# Patient Record
Sex: FEMALE | NOT HISPANIC OR LATINO | Employment: UNEMPLOYED | URBAN - METROPOLITAN AREA
[De-identification: names, ages, dates, MRNs, and addresses within clinical notes are randomized per-mention and may not be internally consistent; named-entity substitution may affect disease eponyms.]

---

## 2021-06-01 ENCOUNTER — OFFICE VISIT (OUTPATIENT)
Dept: DENTISTRY | Facility: CLINIC | Age: 41
End: 2021-06-01

## 2021-06-01 VITALS — DIASTOLIC BLOOD PRESSURE: 85 MMHG | TEMPERATURE: 98.1 F | SYSTOLIC BLOOD PRESSURE: 141 MMHG | HEART RATE: 79 BPM

## 2021-06-01 DIAGNOSIS — Z01.20 ENCOUNTER FOR DENTAL EXAMINATION: Primary | ICD-10-CM

## 2021-06-01 NOTE — PROGRESS NOTES
Removable    Pt presents for a denture remake visit because upper immediate delivered is so bad  and gave verbal consent for treatment:    Reviewed health history - Pt is ASA 1    Treatment consents signed: Yes    Perio: Non applicable    Pain Scale: 0    Caries Assessment: Non applicable     Radiographs: Films are current    Oral Hygiene instruction reviewed    Explained denture making process to patient, including the necessary number of visits and timeframe to make denture  Reviewed denture expectations, adjustment period, and hygiene  Upper alginate impressions made using metal tray and used old cast for lower arch   Apologized to the patient again for the bad denture she received on delivery day and I will ask the lab to expedite the process       Dr Shelley Turner / Dr Marga Bates were present   NV: Border molding and final impression

## 2021-06-21 ENCOUNTER — OFFICE VISIT (OUTPATIENT)
Dept: DENTISTRY | Facility: CLINIC | Age: 41
End: 2021-06-21

## 2021-06-21 VITALS — SYSTOLIC BLOOD PRESSURE: 139 MMHG | TEMPERATURE: 97.3 F | HEART RATE: 103 BPM | DIASTOLIC BLOOD PRESSURE: 80 MMHG

## 2021-06-21 DIAGNOSIS — K08.109 TEETH MISSING: Primary | ICD-10-CM

## 2021-06-21 PROCEDURE — WIS5001 FINAL IMPRESSIONS DENTURE: Performed by: DENTIST

## 2021-06-21 NOTE — PROGRESS NOTES
Pt presents for  final impression  PMH reviewed, no changes  Custom tray evaluated intraorally, flanges ~2-3mm short of vestibule  Border molding completed with medium body PVS and removed after 5 min  Confirmed impression captured vestibules and all tissues without voids or distortions  Pt left satisfied and ambulatory  Pt is interested in getting fixed denture on upper arch, pt was treatment planned and given a copy for costs (located in [employer]), pt was given a referral form for [facility] to call and schedule next month  This denture is a remake cause her immediate did not fit well at all  Pt is aware     Dr José Miguel Verma / Dr Alonso Young were present   NV: wax rims

## 2021-08-10 ENCOUNTER — OFFICE VISIT (OUTPATIENT)
Dept: DENTISTRY | Facility: CLINIC | Age: 41
End: 2021-08-10

## 2021-08-10 VITALS — SYSTOLIC BLOOD PRESSURE: 135 MMHG | DIASTOLIC BLOOD PRESSURE: 77 MMHG | HEART RATE: 94 BPM | TEMPERATURE: 97.5 F

## 2021-08-10 DIAGNOSIS — K08.109 MISSING TEETH, ACQUIRED: Primary | ICD-10-CM

## 2021-08-10 PROCEDURE — D0180 COMPREHENSIVE PERIODONTAL EVALUATION - NEW OR ESTABLISHED PATIENT: HCPCS | Performed by: DENTIST

## 2021-08-10 PROCEDURE — WIS5001 FINAL IMPRESSIONS DENTURE: Performed by: DENTIST

## 2021-08-10 NOTE — PROGRESS NOTES
Pt presents for final impressions for max complete denture  Medical history reviewed, no changes  Pt was scheduled for wax rim apt but we were unable to locate the case  We found upper silginate impression from previous appointment  Tx today:     Final maxillary impression made using light body PVS using upper silginate impression as a custom tray  Border molding movements performed  Opposing lower impression made using silginate to establish occlusal plane of upper denture    Perio eval performed  All probings 4mm or less except for the mesial of #29  Pt will need perio maintenance with localized scaling in that area  Mandibular anteriors (23-26) present with mobility  Some displaying class 2 mobility  Other findings: missing restoration #29 DO  Planned for DO composite  Pt is still interested in upper fixed prosthesis  Discussed cost of the tx with the pt  Pt understands  Dr Gi Le translated throughout the apt       Nv: MMR  Nvv: Perio maintenance  Nvvv: restorative     Ww: Dr Gi Le

## 2021-08-25 LAB — DENTAL LAB ACCEPTED: YES

## 2021-08-27 ENCOUNTER — OFFICE VISIT (OUTPATIENT)
Dept: DENTISTRY | Facility: CLINIC | Age: 41
End: 2021-08-27

## 2021-08-27 VITALS — TEMPERATURE: 97.5 F | HEART RATE: 89 BPM | SYSTOLIC BLOOD PRESSURE: 159 MMHG | DIASTOLIC BLOOD PRESSURE: 95 MMHG

## 2021-08-27 DIAGNOSIS — K08.109 EDENTULOUS: Primary | ICD-10-CM

## 2021-08-27 PROCEDURE — D6010 SURGICAL PLACEMENT OF IMPLANT BODY - ENDOSTEAL IMPLANT: HCPCS | Performed by: DENTIST

## 2021-08-27 RX ORDER — AMOXICILLIN 500 MG/1
500 CAPSULE ORAL EVERY 8 HOURS SCHEDULED
Qty: 21 CAPSULE | Refills: 0 | Status: SHIPPED | OUTPATIENT
Start: 2021-08-27 | End: 2021-09-03

## 2021-08-27 NOTE — PROGRESS NOTES
Implant Placement All-On-6    P: Implant placement #4-8-1-9-11-13  H: RMH - no changes    O: OH - good    T: Applied topical benzocaine, administered 4carps 2% lido 1:100k epi via infiltration and 2carps 4% articaine 1:100k epi via infiltration, and 2 carps 3% carbocaine plain  Made incision on palatal aspect of edentulous #3 to edentulous #14  Made full thickness flap on buccal of #edentulous #3 to edentulous #14  palated reflected a flap with releasing incision mesial to #6  Made initial purchase w/ surgical handpiece and elie bur  Irrigated w/ sterile water  #4: Sequential drills 2 3mm, 2 8mm, 3 4mm, 3 8mm, 4 4mm then hand-screwed implant (4 7 x 10mm) into place  #6: Sequential drills 2 3mm, 2 8mm, 3 4mm, 3 8mm then hand-screwed implant (4 1 x 10mm) into place  #8 : Sequential drills 2 3mm, 2 8mm, 3 4mm then hand-screwed implant (3 7 x 10mm) into place  #9: Sequential drills 2 3mm, 2 8mm, 3 4mm then hand-screwed implant (3 7 x 10mm) into place  #11: Sequential drills 2 3mm, 2 8mm, 3 4mm, 3 8mm then hand-screwed implant (4 1 x 10mm) into place  #13: Sequential drills 2 3mm, 2 8mm, 3 4mm, 3 8mm, 4 4mm then hand-screwed implant (4 7 x 10mm) into place    Placed healing cap  Stabilized keratinized tissue from palatal aspect on buccal where no attached gingival previously existed  Placed 5 0 vicryl sutures  Took post-op pas bc patient felt "dizzy"  E: Pt was very cooperative  NV:   Follow-up for 2nd stage surgery, implant exposure     Wax rim for denture remake

## 2022-01-10 ENCOUNTER — OFFICE VISIT (OUTPATIENT)
Dept: DENTISTRY | Facility: CLINIC | Age: 42
End: 2022-01-10

## 2022-01-10 VITALS — TEMPERATURE: 96.6 F | SYSTOLIC BLOOD PRESSURE: 132 MMHG | HEART RATE: 90 BPM | DIASTOLIC BLOOD PRESSURE: 82 MMHG

## 2022-01-10 DIAGNOSIS — Z01.20 ENCOUNTER FOR DENTAL EXAMINATION: Primary | ICD-10-CM

## 2022-01-10 PROCEDURE — WIS2001 FINAL IMPRESSION - CROWN OR IMPLANT: Performed by: DENTIST

## 2022-01-11 NOTE — PROGRESS NOTES
Second Stage Implant Uncovery and Impression  Pt presents for uncovery of maxillary implants in area of #4, 6, 8, 9, 11, 13  Pt is planned to have a re-do of upper denture but opted to instead making temporary all-in-6 using current maxillary implants  Applied topical benzocaine, administered 3 carps 2% lido 1:100k epi via infiltration and palatal block  Implant in area of #4 and 13 already exposed  Incision made in area of #6, 8, 9, and 11  Cover screws removed and impression copings placed  Verified seating w/ radiographs  Closed tray impression technique using stock tray performed with medium and light body  Placed healing collars as noted below:    #4: 4 5mm base  5 [id number]  #6: 3 5mm base  3 [id number]  #8 :3 5mm base  3 [id number]  #9: 3 5mm base  3 [id number]  #11: 3 5mm base  3 [id number]  #13: 4 5mm base  6 [id number]  Impression w/ impression copings sent to [address] [place] [address] LAB       NV: Delivery of temporary all-in-6 prosthesis

## 2022-02-03 ENCOUNTER — OFFICE VISIT (OUTPATIENT)
Dept: DENTISTRY | Facility: CLINIC | Age: 42
End: 2022-02-03

## 2022-02-03 VITALS — HEART RATE: 93 BPM | TEMPERATURE: 97.7 F | DIASTOLIC BLOOD PRESSURE: 89 MMHG | SYSTOLIC BLOOD PRESSURE: 136 MMHG

## 2022-02-03 DIAGNOSIS — K08.109 EDENTULOUS: Primary | ICD-10-CM

## 2022-02-03 PROCEDURE — WIS1000 RESIDENT TEACHING CASE: Performed by: DENTIST

## 2022-02-03 PROCEDURE — D0191 ASSESSMENT OF A PATIENT: HCPCS | Performed by: DENTIST

## 2022-02-03 NOTE — PROGRESS NOTES
Emergency  S: CC: "The piece of my implant fell off  " Pt identifies #6  O: PA taken of #6  #6 implant is clear with no visible obstruction    Healing abutment   [address],   EO : No swelling  TMJ and mandibular range of motion WNL,   IO:  Oral hygiene fair ,  salivary glands, floor of the mouth (FOM) and tongue to be mucosa, palate, pharynx no significant findings  A: #6: Healing abutment screw stripped  P: Applied topical benzocaine, administered 0 75 carps 4% articaine 1:100k epi via infiltration  Removed excess tissue overgrown on implant  Irrigated implant channel with chlorhexidine  New 3 [id number] healing abutment screwed onto implant  All questions answered and pt dismissed       NV: Delivery of temporary all-in-6 prosthesis

## 2022-04-21 ENCOUNTER — OFFICE VISIT (OUTPATIENT)
Dept: DENTISTRY | Facility: CLINIC | Age: 42
End: 2022-04-21

## 2022-04-21 VITALS — HEART RATE: 101 BPM | SYSTOLIC BLOOD PRESSURE: 137 MMHG | DIASTOLIC BLOOD PRESSURE: 78 MMHG | TEMPERATURE: 98 F

## 2022-04-21 DIAGNOSIS — K08.109 EDENTULOUS: Primary | ICD-10-CM

## 2022-04-21 PROCEDURE — WIS5002 BITE RIMS: Performed by: DENTIST

## 2022-04-21 NOTE — PROGRESS NOTES
Wax Rim for All-on-6    Pt presents for Wax rim  All healing abutments present  Healing abutments removed  Wax rim seated completely and engaged with screw on implant in area of #6 and 11  Verified and adjusted maxillary plane occlusion to be even and balanced and comply with lip length and VDO Verified interpupillary line and ala-tragus line  Marked midline, and canine positioning, and lip length  Noted that cylinder for abutment had slight interference with teeth  Contacted lab and verified that cylinder can be freely modified (cut or trimmed) w/o affected final product  Advise re-evaluation of rim at next visit to confirm adequate height and design  Explained to patient that additional visits are necessary to measure final height of appliance as well as verify positioning of abutments (verification jig)  Pt understood and left in good spirits  NV: Re-eval bite rim and verification jig

## 2022-05-06 ENCOUNTER — OFFICE VISIT (OUTPATIENT)
Dept: DENTISTRY | Facility: CLINIC | Age: 42
End: 2022-05-06

## 2022-05-06 VITALS — TEMPERATURE: 97.8 F

## 2022-05-06 DIAGNOSIS — K08.109 EDENTULOUS: Primary | ICD-10-CM

## 2022-05-06 PROCEDURE — WIS2001 FINAL IMPRESSION - CROWN OR IMPLANT: Performed by: DENTIST

## 2022-05-06 NOTE — PROGRESS NOTES
Verification Jig Final Impression  Pt presents for maxillary final impression with verification jig  Removed healing abutments and rinsed implants with peridex  Placed impression copings with verification jig and verified seating with PAs  Splinted verification jig with duralay  Removed screw on all copings except #13 and verified splinted jig does not bounce or move  Replaced screws and covered with cotton  Covered open tray with wax  Covered copings with light body and tray with heavy body  Impression captured  Copings unscrewed from implants and verified impression  Replaced healing abutments  Selected shade A2  Wax rim taken at previous visit  Case sent to [facility]      NV: Delivery of temporary

## 2022-08-22 ENCOUNTER — OFFICE VISIT (OUTPATIENT)
Dept: DENTISTRY | Facility: CLINIC | Age: 42
End: 2022-08-22

## 2022-08-22 VITALS — TEMPERATURE: 97.7 F | DIASTOLIC BLOOD PRESSURE: 87 MMHG | HEART RATE: 91 BPM | SYSTOLIC BLOOD PRESSURE: 135 MMHG

## 2022-08-22 DIAGNOSIS — Z01.21 ENCOUNTER FOR DENTAL EXAMINATION AND CLEANING WITH ABNORMAL FINDINGS: Primary | ICD-10-CM

## 2022-08-22 PROCEDURE — WIS5005 REMAKE: Performed by: DENTIST

## 2022-08-22 NOTE — PROGRESS NOTES
Delivery of Implant temporary Crowns Maxillary All on 6  Pt presents for delivery of temporary screw retained implant maxillary all on 6  PMH reviewed, no changes  Removed healing abutments and peridex rinsed areas  Denture tried in, seated and hand torque  Obtained PA radiograph, confirmed abutments / detnure fully seated  Occlusion checked and ideal occlusal markings in CO and protrusive movements  Pt confirmed shade and crown shape in pt mirror  Final torque of 15Ncm  Screw access covered with wayne tape, flowable and light cured  Verified occlusion  Pt satisfied with function and esthetics, left ambulatory and satisfied       NV: follow up all of 6 temp  NVV: final restoration

## 2022-08-30 ENCOUNTER — OFFICE VISIT (OUTPATIENT)
Dept: DENTISTRY | Facility: CLINIC | Age: 42
End: 2022-08-30

## 2022-08-30 VITALS — DIASTOLIC BLOOD PRESSURE: 88 MMHG | TEMPERATURE: 96.3 F | HEART RATE: 98 BPM | SYSTOLIC BLOOD PRESSURE: 135 MMHG

## 2022-08-30 DIAGNOSIS — Z01.21 ENCOUNTER FOR DENTAL EXAMINATION AND CLEANING WITH ABNORMAL FINDINGS: Primary | ICD-10-CM

## 2022-08-30 PROCEDURE — D0191 ASSESSMENT OF A PATIENT: HCPCS | Performed by: DENTIST

## 2022-08-30 NOTE — PROGRESS NOTES
Pt presented for evaluation after placement of temporary restoration all on 6  ASA: I  Pain:0    Pt feel the teeth are too big and too far buccal  Pt is happy with tooth shade and midline  Dr Parris Lopez not here today will bring back on Dr Parris Lopez day to discuss changing the mould of the teeth  Pt left satisfied       NV: evaluation temporary all on 6 teeth with Dr Parris Lopez

## 2022-09-09 ENCOUNTER — OFFICE VISIT (OUTPATIENT)
Dept: DENTISTRY | Facility: CLINIC | Age: 42
End: 2022-09-09

## 2022-09-09 VITALS — TEMPERATURE: 96.7 F

## 2022-09-09 DIAGNOSIS — Z01.20 ENCOUNTER FOR DENTAL EXAMINATION AND CLEANING WITHOUT ABNORMAL FINDINGS: Primary | ICD-10-CM

## 2022-09-09 PROCEDURE — D0191 ASSESSMENT OF A PATIENT: HCPCS | Performed by: DENTIST

## 2022-09-09 NOTE — PROGRESS NOTES
Pt presented for assessment temp all on 6  Pt feels teeth are too protruded  Canines are sharp, midline slightly crooked  Teeth are too big  Dr Karan Watt examined and shortened teeth slightly and rounded incisal edges of canines  Adjusted protruded tooth #25 on bottom  Pt happier with look, pt would like crowns on the 4 anterior teeth #23-26  Pt made aware prognosis is guarded and may not last too long, recommended 2 implants and a bridge but pt prefers single unit crowns  Will be discussed further after perio maintenance apt  Pt left satisfied       NV: perio maintenance

## 2022-10-28 ENCOUNTER — OFFICE VISIT (OUTPATIENT)
Dept: DENTISTRY | Facility: CLINIC | Age: 42
End: 2022-10-28

## 2022-10-28 VITALS — SYSTOLIC BLOOD PRESSURE: 140 MMHG | DIASTOLIC BLOOD PRESSURE: 89 MMHG | HEART RATE: 85 BPM

## 2022-10-28 DIAGNOSIS — K05.6 PERIODONTAL DISEASE: Primary | ICD-10-CM

## 2022-10-28 PROBLEM — E61.1 IRON DEFICIENCY: Status: ACTIVE | Noted: 2022-09-08

## 2022-10-28 RX ORDER — ERGOCALCIFEROL (VITAMIN D2) 1250 MCG
1 CAPSULE ORAL 2 TIMES WEEKLY
COMMUNITY
Start: 2022-09-01 | End: 2022-11-24

## 2022-10-28 RX ORDER — ERGOCALCIFEROL 1.25 MG/1
CAPSULE ORAL
COMMUNITY
Start: 2022-09-01

## 2022-10-28 NOTE — PROGRESS NOTES
Periodontal maintenance  Reviewed hhx  ASA: I    Perio charting completed  Perio findings: localized 4-6mm pockets with generalized recession  Pt has class I mobility on 23, 24, 25 and class II or III mobility on #21  Cavitron/hand scalers used, polished and flossed- heavy staining present  OH: fair  OHI: reviewed importance of brushing 2x/day, flossing subgingivally daily and using a daily mouthrinse  Discussed importance of regular 3-4 month periodontal maintenance appts  Explained to the patient without good home care and irregular dental visits, this disease can return  Patient understands  Dr calixto examined pt, temp all on 6 broke ( teeth #13,14 broke off exposing implant abutment)  Tooth #29 DO fractured, resin planned  Pt interested in anterior crowns, will discuss further at next apt  Pt to come back for emergency apt with dr Gogo Benedict and dr Trudi Parks for broken all on 6 temp next week, pt to be seen asap  Pt dismissed in good health, no complications and all questions answered  NV: return to address broken temp  NV2: roberto appt 11/22/22 and complete periodic exam  NV3: 3 month periodontal mt appt       Mery Padilla, [facility]

## 2022-11-04 ENCOUNTER — OFFICE VISIT (OUTPATIENT)
Dept: DENTISTRY | Facility: CLINIC | Age: 42
End: 2022-11-04

## 2022-11-04 VITALS — HEART RATE: 80 BPM | DIASTOLIC BLOOD PRESSURE: 84 MMHG | TEMPERATURE: 97.1 F | SYSTOLIC BLOOD PRESSURE: 137 MMHG

## 2022-11-04 DIAGNOSIS — K02.62 DENTAL CARIES EXTENDING INTO DENTIN: Primary | ICD-10-CM

## 2022-11-04 NOTE — PROGRESS NOTES
Composite Filling    Shellie Gordon presents for composite filling and assesment of broken all on 6 temp  PMH reviewed, no changes  ASA: II    Applied topical benzocaine, administered 1 carps 4% articaine 1:100k epi via infiltration    Prepped tooth #29-DO with 245 carbide on high speed  Caries removed with round carbide on slow speed  Isolation with cotton rolls     Etch with 37% H2PO4, rinse, dry  Applied Adhese with 20 second scrub once, gentle air dry and light cured for 10s  Restored with Tetric bulk marshal shade A2 and light cured  Refined with finishing burs, polished with enhance point  Verified occlusion  Pt left satisfied  Dr Mazin Colon examined broken temp and we will leave it until ready for final impression to make final restoration       NV: all on 6 implant impression 90 minutes

## 2023-02-24 ENCOUNTER — OFFICE VISIT (OUTPATIENT)
Dept: DENTISTRY | Facility: CLINIC | Age: 43
End: 2023-02-24

## 2023-02-24 VITALS — SYSTOLIC BLOOD PRESSURE: 146 MMHG | HEART RATE: 85 BPM | DIASTOLIC BLOOD PRESSURE: 91 MMHG | TEMPERATURE: 97.8 F

## 2023-02-24 DIAGNOSIS — Z01.21 ENCOUNTER FOR DENTAL EXAMINATION AND CLEANING WITH ABNORMAL FINDINGS: Primary | ICD-10-CM

## 2023-02-24 PROBLEM — E04.2 MULTIPLE THYROID NODULES: Status: ACTIVE | Noted: 2023-02-15

## 2023-02-24 NOTE — DENTAL PROCEDURE DETAILS
Arjun Soto presents for a Periodic exam and periodontal mt  Verbal consent for treatment given in addition to the forms  Reviewed health history - Patient is ASA II  Consents signed: Yes     Perio: Generalized  Pain Scale: some teeth are painful due to mobility  Caries Assessment: Low  Radiographs: None    Dr Daisha Marin PeriodicX:  Recommend extraction of #21 and possible #28 due to class III mobility and risk of infection  Patient understands but states she would rather the tooth fall out on its own that have it extracted  Dr Daisha Marin explained the procedure for her next appt ie take temp off, impressions, send to lab for final maxillary prosthesis, re-attach temp bridge  Patient understands  Periodontal maintenance  Reviewed meds/hxx in Epic  ASA II    [employer] translation Icelandic [id number]    Perio charting completed  Perio findings: generalized advanced periodontal disease  Mobility present on all remaining teeth  #21 and 28 III Mobility    Cavitron and handscale, polished and flossed  Patient very sensitive during cleaning today on all remaining julisa teeth due to gen mobility and adv recession  OH: Poor  OHI: reviewed importance of brushing 2x/day, flossing subgingivally daily and using a daily mouthrinse  Discussed importance of regular 3-4 month periodontal maintenance appts  Explained to the patient without good home care and irregular dental visits, this disease can return  Patient understands

## 2023-03-17 ENCOUNTER — OFFICE VISIT (OUTPATIENT)
Dept: DENTISTRY | Facility: CLINIC | Age: 43
End: 2023-03-17

## 2023-03-17 VITALS — HEART RATE: 90 BPM | TEMPERATURE: 97.6 F | SYSTOLIC BLOOD PRESSURE: 155 MMHG | DIASTOLIC BLOOD PRESSURE: 90 MMHG

## 2023-03-17 DIAGNOSIS — Z46.3 DENTAL PROSTHESIS FITTING OR ADJUSTMENT: Primary | ICD-10-CM

## 2023-03-23 NOTE — PROGRESS NOTES
Pt presented for all on 6 maxillary PMMA try in  ASA: II  Pain: 0    Resin removed from access hole and wayne tape removed, implant screws loosened and temp all on 6 removed  Implants cleaned with peridex rinse  PMMA made with engaging abutments, cut off engaging to make them non-engagging and confirmed seating with radiographs  Pt was unhappy with design on new PMMA and wanted a closer design to the temporary she was currently wearing  Adjusted the occlusion on the new PMMA so pt did not have an open bite, added resin to the right side to make occlusal plane more even  Took an impressio of the PMMA in the mouth with the bite so lab can see the occlusion  Put the original temp all on 6 max prosthesis back on, confirmed seating with radiographs  Cotton pellets and resin placed to cover access holes  New impressio  Taken of temp in the mouth since its been adjusted to send back to the lab with the bite  Since new PMMA seated the final impression the lab has is accurate and a new final impression is not needed  Will need to send back to the lab for a new PMMA fabrication with non-engaging abutments and adjusted design  Case will need to be remounted and will be asking for monoplane occlusion and will adjust the lower teeth accordingly  Also requesting the lab make only 1 premolar and molar on the left side, cantilever has too much force and broke on the temp       NV: PMMA try in 2 hours when back from lab with dr Natalie Sales

## 2023-05-17 ENCOUNTER — OFFICE VISIT (OUTPATIENT)
Dept: DENTISTRY | Facility: CLINIC | Age: 43
End: 2023-05-17

## 2023-05-17 DIAGNOSIS — Z01.20 ENCOUNTER FOR DENTAL EXAMINATION AND CLEANING WITHOUT ABNORMAL FINDINGS: Primary | ICD-10-CM

## 2023-05-18 NOTE — PROGRESS NOTES
Pt presented for PMMA max all on 6 try in    ASA: II  Pain: 0    No anesthesia needed    Composite and cotton pellets removed over access holes, temp all on 6 removed  Rinsed with peridex  New PMMA placed on and screwed in  Patient dislikes the shape and esthetic  Feels the teeth are too small and wants the final to look more like her temporary  Bite taken, open bite seen in the posterior, patient has reverse curve of spee  Temp all on 6 screwed back in, cotton pellets and flowable to cover access holes  Radiograph taken to confirm seating  Bite comfortable       Pt left satisfied    NV: New try in on dr Lopez Filler day only

## 2023-08-08 ENCOUNTER — OFFICE VISIT (OUTPATIENT)
Dept: DENTISTRY | Facility: CLINIC | Age: 43
End: 2023-08-08

## 2023-08-08 VITALS — DIASTOLIC BLOOD PRESSURE: 84 MMHG | SYSTOLIC BLOOD PRESSURE: 130 MMHG | HEART RATE: 79 BPM

## 2023-08-08 DIAGNOSIS — K05.30 PERIODONTITIS: Primary | ICD-10-CM

## 2023-08-08 PROCEDURE — D4910 PERIODONTAL MAINTENANCE: HCPCS

## 2023-08-08 RX ORDER — MULTIVITAMIN WITH FOLIC ACID 400 MCG
1 TABLET ORAL DAILY
COMMUNITY
Start: 2023-05-17

## 2023-08-08 RX ORDER — PHENTERMINE HYDROCHLORIDE 37.5 MG/1
37.5 CAPSULE ORAL EVERY MORNING
COMMUNITY
Start: 2023-06-07

## 2023-08-08 NOTE — DENTAL PROCEDURE DETAILS
PERIODONTAL MAINTENANCE     REVIEWED MED HX: meds, allergies, health changes reviewed in EPIC  CHIEF CONCERN:  none   PAIN SCALE:  0  ASA CLASS:  2  PLAQUE:  heavy  CALCULUS:  heavy  BLEEDING:   heavy  STAIN :   moderate   ORAL HYGIENE:  poor/needs improvement  PERIO: 4C    Hand scaled, polished and flossed. Used Cavitron sparingly. Patient very sensitive due to generalized mobility on all lower remaining teeth. Heavy calculus on implant abutment UL. Temporary bridge remains on maxillary all on 6. Permanent bridge for maxillary all on 6 is here, recommend patient return for try in w/ resident and Dr Monique Mora. Recommend 3/4 mrcs instead of 6 months. Oral Hygiene Instruction:  recommended brushing 2 x daily for 2 minutes MIN, recommended flossing daily, reviewed dietary precautions.        TREATMENT  PLAN :   1) New try in on dr. Belle Mitchell day only     Next Recall: 3/4 month recall

## 2023-09-01 ENCOUNTER — OFFICE VISIT (OUTPATIENT)
Dept: DENTISTRY | Facility: CLINIC | Age: 43
End: 2023-09-01

## 2023-09-01 VITALS — DIASTOLIC BLOOD PRESSURE: 87 MMHG | SYSTOLIC BLOOD PRESSURE: 134 MMHG | HEART RATE: 83 BPM

## 2023-09-01 DIAGNOSIS — Z01.20 ENCOUNTER FOR DENTAL EXAMINATION: Primary | ICD-10-CM

## 2023-09-01 PROCEDURE — WIS5003 WAX TRY-IN

## 2023-09-01 NOTE — DENTAL PROCEDURE DETAILS
Pt presents for try in of maxillary all of 6 implant prosthesis. PMH reviewed, no changes. No pain reported  Pt has broken temporary distal of last implant in upper left. Pt does not know for how long it has been broken in that area but does not bother her. Removed flowable composite in access holes with corina bur and round bur on high speed. Unscrewed 6 screws and soaked in Peridex. Removed prosthesis and 2 abutments fell off of the prosthesis. Rinsed implants with Peridex and cleaned previous temporary. Tried in new prosthesis and confirmed seating with PA radiographs. Upper left anterior was not fully screwed in so tightened until seated. Pt likes shape and size of teeth and contour and approved try in. Occlusion verified as acceptable. Adjusted cusp tips of #11,12 as they were too sharp. Placed healing abutment on distal implant in UL since prosthesis broke in that area. Removed interferences in temporary to ensure full seating with healing abutment screwed on. Placed 2 abutments that fell out back into temporary and screwed 5 screws on. Placed wayne tape in access holes and restored with flowable composite shade A3.5    Pt chose tooth shade D3 from Cris classical guide and gingival shade G3 from  Ivoclar vivadent IPS d. Sign IPS InLine SR Adoro guide.     Pt left in good disposition    NV: deliver all on 6 final prosthesis, 120 minutes on Dr Inez Sheriff day with Dr Rigo Graham

## 2023-09-11 ENCOUNTER — OFFICE VISIT (OUTPATIENT)
Dept: DENTISTRY | Facility: CLINIC | Age: 43
End: 2023-09-11

## 2023-09-11 DIAGNOSIS — T85.698A LOOSENING OF PROSTHESIS: Primary | ICD-10-CM

## 2023-09-11 PROCEDURE — WIS97700 ADJUST ORTHOTIC/SPLINT

## 2023-09-11 NOTE — DENTAL PROCEDURE DETAILS
All on 6 Adjustment    Pt presents with CC of "my bridge is loose". PMH reviewed, no changes. Pt is ASA 1. No pain reported. Bridge seated but noted very unstable and rocking on 1 secured implant. Healing abutment in UL is not interfering with seating of prosthesis. Removed flowable composite from access holes with high speed corina round ended bur. Removed martin tape from access holes and unscrewed prosthesis with hand . Soaked implant screws and prosthesis in chlorhexidine and rinsed patient's gingiva with chlorhexidine in a monojet syringe. 1 Abutment came loose from prosthesis and fell out upon removal from mouth. Sandblasted abutment of excess cement and dried and cleaned abutment and prosthesis. Placed cotton pellet in screw hole, placed Calibria cement around abutment and re-inserted into prosthesis screw channel hole. Cleaned excess with microbrush and light cured 20 seconds. Removed cotton pellet and screwed back prosthesis in with hand  and torque wrench to 15 Ncm. Martin tape packed in screw hole and restored access holes with flowable composite shade A3. Light cured 20 seconds. Checked occlusion to be acceptable. Pt stated she liked the aesthetics, feel, and comfort and noticed the tightness of the bridge. Informed pt to avoid hard and sticky foods and stick to soft diet until delivery on final prosthesis in approximately 2 weeks. Pt understood and left in good spirits.      NV: deliver all on 6, 90 mins

## 2023-10-05 ENCOUNTER — APPOINTMENT (OUTPATIENT)
Dept: LAB | Facility: HOSPITAL | Age: 43
End: 2023-10-05
Payer: COMMERCIAL

## 2023-10-05 DIAGNOSIS — E61.1 IRON DEFICIENCY: ICD-10-CM

## 2023-10-05 DIAGNOSIS — E55.9 VITAMIN D DEFICIENCY: ICD-10-CM

## 2023-10-05 DIAGNOSIS — Z13.220 SCREENING FOR LIPOID DISORDERS: ICD-10-CM

## 2023-10-05 LAB
25(OH)D3 SERPL-MCNC: 32.1 NG/ML (ref 30–100)
BASOPHILS # BLD AUTO: 0.02 THOUSANDS/ÂΜL (ref 0–0.1)
BASOPHILS NFR BLD AUTO: 0 % (ref 0–1)
CHOLEST SERPL-MCNC: 155 MG/DL
EOSINOPHIL # BLD AUTO: 0.12 THOUSAND/ÂΜL (ref 0–0.61)
EOSINOPHIL NFR BLD AUTO: 2 % (ref 0–6)
ERYTHROCYTE [DISTWIDTH] IN BLOOD BY AUTOMATED COUNT: 15.9 % (ref 11.6–15.1)
FERRITIN SERPL-MCNC: 4 NG/ML (ref 11–307)
HCT VFR BLD AUTO: 34.2 % (ref 34.8–46.1)
HDLC SERPL-MCNC: 55 MG/DL
HGB BLD-MCNC: 10.1 G/DL (ref 11.5–15.4)
IMM GRANULOCYTES # BLD AUTO: 0.01 THOUSAND/UL (ref 0–0.2)
IMM GRANULOCYTES NFR BLD AUTO: 0 % (ref 0–2)
IRON SATN MFR SERPL: 3 % (ref 15–50)
IRON SERPL-MCNC: 13 UG/DL (ref 50–212)
LDLC SERPL CALC-MCNC: 85 MG/DL (ref 0–100)
LYMPHOCYTES # BLD AUTO: 1.63 THOUSANDS/ÂΜL (ref 0.6–4.47)
LYMPHOCYTES NFR BLD AUTO: 28 % (ref 14–44)
MCH RBC QN AUTO: 21.4 PG (ref 26.8–34.3)
MCHC RBC AUTO-ENTMCNC: 29.5 G/DL (ref 31.4–37.4)
MCV RBC AUTO: 73 FL (ref 82–98)
MONOCYTES # BLD AUTO: 0.4 THOUSAND/ÂΜL (ref 0.17–1.22)
MONOCYTES NFR BLD AUTO: 7 % (ref 4–12)
NEUTROPHILS # BLD AUTO: 3.56 THOUSANDS/ÂΜL (ref 1.85–7.62)
NEUTS SEG NFR BLD AUTO: 63 % (ref 43–75)
NONHDLC SERPL-MCNC: 100 MG/DL
NRBC BLD AUTO-RTO: 0 /100 WBCS
PLATELET # BLD AUTO: 271 THOUSANDS/UL (ref 149–390)
PMV BLD AUTO: 10.5 FL (ref 8.9–12.7)
RBC # BLD AUTO: 4.72 MILLION/UL (ref 3.81–5.12)
TIBC SERPL-MCNC: 380 UG/DL (ref 250–450)
TRIGL SERPL-MCNC: 76 MG/DL
UIBC SERPL-MCNC: 367 UG/DL (ref 155–355)
WBC # BLD AUTO: 5.74 THOUSAND/UL (ref 4.31–10.16)

## 2023-10-05 PROCEDURE — 83540 ASSAY OF IRON: CPT

## 2023-10-05 PROCEDURE — 83550 IRON BINDING TEST: CPT

## 2023-10-05 PROCEDURE — 36415 COLL VENOUS BLD VENIPUNCTURE: CPT

## 2023-10-05 PROCEDURE — 82728 ASSAY OF FERRITIN: CPT

## 2023-10-05 PROCEDURE — 82306 VITAMIN D 25 HYDROXY: CPT

## 2023-10-05 PROCEDURE — 80061 LIPID PANEL: CPT

## 2023-10-05 PROCEDURE — 85025 COMPLETE CBC W/AUTO DIFF WBC: CPT

## 2023-11-22 ENCOUNTER — OFFICE VISIT (OUTPATIENT)
Dept: DENTISTRY | Facility: CLINIC | Age: 43
End: 2023-11-22

## 2023-11-22 DIAGNOSIS — Z46.3 DENTAL PROSTHESIS FITTING OR ADJUSTMENT: Primary | ICD-10-CM

## 2023-11-22 PROCEDURE — WIS97700 ADJUST ORTHOTIC/SPLINT

## 2023-11-22 NOTE — DENTAL PROCEDURE DETAILS
Pt presents for delivery of maxillary all of 6 implant prosthesis. PMH reviewed, no changes. No pain reported    Removed flowable composite in access holes with corina bur and round bur on high speed. Removed wayne tape packed over screw channel with barbed broach file. Unscrewed 5 screws and healing abutment and soaked in Peridex along with temporary maxillary prosthesis. Rinsed implants with Peridex and cleaned previous temporary. Tried in new prosthesis and confirmed seating with PA radiographs. Pt likes shape and size of teeth and contour. Occlusion verified as acceptable with horseshoe articulating paper. Pt unhappy with color of gingiva, states "it is too light". Informed pt this is the color she chose and held shade guide to her mouth. Pt states she wants this color and pointed to G4 as it more closely resembles her gingiva. Pt chose G3 at last visit. Placed temporary prosthesis and screwed 5 screws on and healing abutment. Placed wayne tape in access holes and restored with flowable composite shade A2. Pt chose gingival shade G4 from  Ivoclar vivadent IPS d. Sign IPS InLine SR Adoro guide. Pt informed of wait time to send back to lab to stain gingiva different shade. Pt understood.   Pt left in good disposition     NV: deliver all on 6 final prosthesis, 120 minutes on Dr Ivone Hinkle day with Dr Menjivar Nurse

## 2024-01-09 ENCOUNTER — OFFICE VISIT (OUTPATIENT)
Dept: DENTISTRY | Facility: CLINIC | Age: 44
End: 2024-01-09

## 2024-01-09 VITALS — SYSTOLIC BLOOD PRESSURE: 161 MMHG | DIASTOLIC BLOOD PRESSURE: 87 MMHG | HEART RATE: 92 BPM

## 2024-01-09 DIAGNOSIS — K08.109 EDENTULISM: Primary | ICD-10-CM

## 2024-01-09 PROCEDURE — WIS6065 PB IMPLANT SUPPORTED CROWN

## 2024-01-09 NOTE — DENTAL PROCEDURE DETAILS
Pt presents for delivery of maxillary all of 6 implant prosthesis.     PMH reviewed, no changes. No pain reported     Removed flowable composite in access holes with corina bur and round bur on high speed. Removed wayne tape packed over screw channel with barbed broach file. Unscrewed 5 screws and healing abutment and soaked in Peridex along with temporary maxillary prosthesis.     Rinsed implants and gingiva with Peridex and cleaned previous temporary. Tried in new prosthesis by hand torque and confirmed seating with 3 PA radiographs. Cleanliness achievable via floss threaders. Pt likes shape and size of teeth and contour of overall prosthesis. Occlusion verified as acceptable with horseshoe articulating paper. Pt is now happy with new color of gingiva after darkening it from last visit. Pt showed prosthesis to her  who also showed approval of prosthesis delivery. Pt accepted prosthesis and was happy for delivery today.    Oral hygiene instructions reviewed and demonstrated on patient. Recommended use of floss threaders and waterpik daily.     Screwed 6 implants onto abutments and torqued to 35 Ncm, Placed wayne tape in access holes and restored with flowable composite shade A2. Rechecked occlusion and adjusted with finishing burs.    Discussed with pt that we need to treatment plan mandible next to treat her periodontal disease. Pt understood    NV: treatment planning with Dr Barajas and Dr TOM

## 2024-02-16 ENCOUNTER — OFFICE VISIT (OUTPATIENT)
Dept: DENTISTRY | Facility: CLINIC | Age: 44
End: 2024-02-16

## 2024-02-16 DIAGNOSIS — Z01.20 ENCOUNTER FOR DENTAL EXAMINATION: Primary | ICD-10-CM

## 2024-02-16 PROCEDURE — D0140 LIMITED ORAL EVALUATION - PROBLEM FOCUSED: HCPCS

## 2024-02-16 PROCEDURE — D0230 INTRAORAL - PERIAPICAL EACH ADDITIONAL RADIOGRAPHIC IMAGE: HCPCS

## 2024-02-16 PROCEDURE — D0220 INTRAORAL - PERIAPICAL FIRST RADIOGRAPHIC IMAGE: HCPCS

## 2024-02-16 NOTE — DENTAL PROCEDURE DETAILS
Shellie Gordon presents to clinic for treatment planning visit.     Reviewed medical history, no changes pt is ASA 1. No pain reported    A periodontal exam indicates the following for quadrant(s): LL,LR    Classification: Stage 4 Grade C    Gingiva Color: pink and localized red     Gingival Inflammation: Severe    Free Gingival Margins: inflammed    Interdental Papillae: swollen    Gingiva Consistency: Localized  firm and resilient. Generalized soft and edematous     Gingiva Surface Texture: varies from smooth to rough    Attached Gingiva: lost in localized areas    Gingival Sulcus: Blood    Gingival Bleeding: severe    Suppuration:mild localized    Pattern of Bone Loss: generalized    Severity of Bone Loss: moderate to severe localized    Plaque:heavy    Calculus: heavy subgingival    Dentinal Hypersensitivity: none    Tooth Stain: crowns    Full periodontal charting completed today. Multiple mobile teeth with greater than 10mm attachment loss and severe bone loss. Pt informed that most of her mandibular teeth have a hopeless prognosis and should be extracted. Pt understood and wanted to make sure she did not go without teeth again like last time. Pt was unable to wear her interim immediate denture during healing phase before getting implant prosthesis. Pt given treatment plan options:    Ext all remaining teeth and deliver immediate denture  2.   Ext worst periodontally involved teeth and deliver a temporary immediate prosthesis before getting implants in the future.     Pt accepted option 2 because of better chance of her able to wear that prosthesis during healing phase. Informed pt that we will need to do open flap debridement to clean infected periodontium to retain the selected teeth and ensure better bone healing. During same visit we will ext hopeless teeth.   Explained risks, benefits, alternatives of all treatment, pt given opportunity to ask questions all questions answered.    Impression of lower arch  captured with [employer] and sent to [facility] for immediate partial denture fabrication    NV: open flap debridement of LL,LR quads, ext #21,23,24,25,26,28, and deliver immediate denture. 3 hours Dr Barajas and Dr TOM

## 2024-05-20 ENCOUNTER — TELEPHONE (OUTPATIENT)
Dept: DENTISTRY | Facility: CLINIC | Age: 44
End: 2024-05-20

## 2024-10-14 ENCOUNTER — OFFICE VISIT (OUTPATIENT)
Dept: DENTISTRY | Facility: CLINIC | Age: 44
End: 2024-10-14

## 2024-10-14 DIAGNOSIS — K08.89 NONRESTORABLE TOOTH: Primary | ICD-10-CM

## 2024-10-14 PROCEDURE — D0120 PERIODIC ORAL EVALUATION - ESTABLISHED PATIENT: HCPCS

## 2024-10-14 NOTE — DENTAL PROCEDURE DETAILS
"     PERIODIC EXAM, (no xrays due)   REVIEWED MED HX: meds, allergies, health changes reviewed in [facility]. All consents signed.  CHIEF CONCERN: \"My lower teeth are wobbling\"  PAIN SCALE:  0  ASA CLASS:  ASA 2 - Healthy pt with functional limitations  PLAQUE:  moderate  CALCULUS: Localized  Lower anteriors  BLEEDING:  moderate  STAIN : Moderate  Coffee/Tea  On all remaining teeth #19-29.      PERIO: Chronic severe periodontitis, stage IV, grade C    Visual and Tactile Intraoral/ Extraoral evaluation: Oral and Oropharyngeal cancer evaluation. Severe bone loss (>33%) on all remaining natural dentition from teeth #19-28.  Mobility of 3 is found on teeth #21, 23-26,28.     Dr. Howard Greene Reviewed with patient clinical and radiographic findings and patient verbalized understanding. All questions and concerns addressed.     REFERRALS: None    CARIES FINDINGS: None    TREATMENT  PLAN : EXT of #19-28 and delivery of lower immediate/interim complete dentures; Spoke to pt about possibility of implant-supported dentures for lower arch once healed 3-6 months after EXT.    NV: Will be scheduled once pre-auth for lower complete interim dentures come back.  "

## 2025-02-27 ENCOUNTER — APPOINTMENT (OUTPATIENT)
Dept: LAB | Facility: MEDICAL CENTER | Age: 45
End: 2025-02-27
Payer: COMMERCIAL

## 2025-02-27 DIAGNOSIS — E04.1 NONTOXIC UNINODULAR GOITER: ICD-10-CM

## 2025-02-27 DIAGNOSIS — Z13.6 SCREENING FOR ISCHEMIC HEART DISEASE: ICD-10-CM

## 2025-02-27 DIAGNOSIS — R03.0 ELEVATED BLOOD PRESSURE READING WITHOUT DIAGNOSIS OF HYPERTENSION: ICD-10-CM

## 2025-02-27 DIAGNOSIS — E53.8 BIOTIN-(PROPIONYL-COA-CARBOXYLASE) LIGASE DEFICIENCY: ICD-10-CM

## 2025-02-27 DIAGNOSIS — E61.1 IRON DEFICIENCY: ICD-10-CM

## 2025-02-27 DIAGNOSIS — Z00.00 ROUTINE GENERAL MEDICAL EXAMINATION AT A HEALTH CARE FACILITY: ICD-10-CM

## 2025-02-27 DIAGNOSIS — R73.03 DIABETES MELLITUS, LATENT: ICD-10-CM

## 2025-02-27 LAB
ALBUMIN SERPL BCG-MCNC: 4.2 G/DL (ref 3.5–5)
ALP SERPL-CCNC: 79 U/L (ref 34–104)
ALT SERPL W P-5'-P-CCNC: 25 U/L (ref 7–52)
ANION GAP SERPL CALCULATED.3IONS-SCNC: 9 MMOL/L (ref 4–13)
AST SERPL W P-5'-P-CCNC: 22 U/L (ref 13–39)
BASOPHILS # BLD AUTO: 0.03 THOUSANDS/ÂΜL (ref 0–0.1)
BASOPHILS NFR BLD AUTO: 0 % (ref 0–1)
BILIRUB SERPL-MCNC: 0.51 MG/DL (ref 0.2–1)
BUN SERPL-MCNC: 8 MG/DL (ref 5–25)
CALCIUM SERPL-MCNC: 9.6 MG/DL (ref 8.4–10.2)
CHLORIDE SERPL-SCNC: 104 MMOL/L (ref 96–108)
CHOLEST SERPL-MCNC: 176 MG/DL (ref ?–200)
CO2 SERPL-SCNC: 25 MMOL/L (ref 21–32)
CREAT SERPL-MCNC: 0.47 MG/DL (ref 0.6–1.3)
CREAT UR-MCNC: 25.8 MG/DL
EOSINOPHIL # BLD AUTO: 0.1 THOUSAND/ÂΜL (ref 0–0.61)
EOSINOPHIL NFR BLD AUTO: 1 % (ref 0–6)
ERYTHROCYTE [DISTWIDTH] IN BLOOD BY AUTOMATED COUNT: 14.2 % (ref 11.6–15.1)
EST. AVERAGE GLUCOSE BLD GHB EST-MCNC: 74 MG/DL
FERRITIN SERPL-MCNC: 17 NG/ML (ref 11–307)
GFR SERPL CREATININE-BSD FRML MDRD: 120 ML/MIN/1.73SQ M
GLUCOSE P FAST SERPL-MCNC: 81 MG/DL (ref 65–99)
HBA1C MFR BLD: 4.2 %
HCT VFR BLD AUTO: 40.4 % (ref 34.8–46.1)
HDLC SERPL-MCNC: 54 MG/DL
HGB BLD-MCNC: 12.9 G/DL (ref 11.5–15.4)
IMM GRANULOCYTES # BLD AUTO: 0.02 THOUSAND/UL (ref 0–0.2)
IMM GRANULOCYTES NFR BLD AUTO: 0 % (ref 0–2)
IRON SATN MFR SERPL: 10 % (ref 15–50)
IRON SERPL-MCNC: 36 UG/DL (ref 50–212)
LDLC SERPL CALC-MCNC: 104 MG/DL (ref 0–100)
LYMPHOCYTES # BLD AUTO: 1.51 THOUSANDS/ÂΜL (ref 0.6–4.47)
LYMPHOCYTES NFR BLD AUTO: 22 % (ref 14–44)
MCH RBC QN AUTO: 26.4 PG (ref 26.8–34.3)
MCHC RBC AUTO-ENTMCNC: 31.9 G/DL (ref 31.4–37.4)
MCV RBC AUTO: 83 FL (ref 82–98)
MICROALBUMIN UR-MCNC: <7 MG/L
MONOCYTES # BLD AUTO: 0.48 THOUSAND/ÂΜL (ref 0.17–1.22)
MONOCYTES NFR BLD AUTO: 7 % (ref 4–12)
NEUTROPHILS # BLD AUTO: 4.81 THOUSANDS/ÂΜL (ref 1.85–7.62)
NEUTS SEG NFR BLD AUTO: 70 % (ref 43–75)
NONHDLC SERPL-MCNC: 122 MG/DL
NRBC BLD AUTO-RTO: 0 /100 WBCS
PLATELET # BLD AUTO: 258 THOUSANDS/UL (ref 149–390)
PMV BLD AUTO: 10.4 FL (ref 8.9–12.7)
POTASSIUM SERPL-SCNC: 4.4 MMOL/L (ref 3.5–5.3)
PROT SERPL-MCNC: 7.2 G/DL (ref 6.4–8.4)
RBC # BLD AUTO: 4.88 MILLION/UL (ref 3.81–5.12)
RETICS # AUTO: ABNORMAL 10*3/UL (ref 14097–95744)
RETICS # CALC: 2.77 % (ref 0.37–1.87)
SODIUM SERPL-SCNC: 138 MMOL/L (ref 135–147)
T4 FREE SERPL-MCNC: 0.97 NG/DL (ref 0.61–1.12)
TIBC SERPL-MCNC: 355.6 UG/DL (ref 250–450)
TRANSFERRIN SERPL-MCNC: 254 MG/DL (ref 203–362)
TRIGL SERPL-MCNC: 90 MG/DL (ref ?–150)
TSH SERPL DL<=0.05 MIU/L-ACNC: 0.07 UIU/ML (ref 0.45–4.5)
UIBC SERPL-MCNC: 320 UG/DL (ref 155–355)
VIT B12 SERPL-MCNC: 155 PG/ML (ref 180–914)
WBC # BLD AUTO: 6.95 THOUSAND/UL (ref 4.31–10.16)

## 2025-02-27 PROCEDURE — 85045 AUTOMATED RETICULOCYTE COUNT: CPT

## 2025-02-27 PROCEDURE — 83540 ASSAY OF IRON: CPT

## 2025-02-27 PROCEDURE — 36415 COLL VENOUS BLD VENIPUNCTURE: CPT

## 2025-02-27 PROCEDURE — 80061 LIPID PANEL: CPT

## 2025-02-27 PROCEDURE — 80053 COMPREHEN METABOLIC PANEL: CPT

## 2025-02-27 PROCEDURE — 83036 HEMOGLOBIN GLYCOSYLATED A1C: CPT

## 2025-02-27 PROCEDURE — 82728 ASSAY OF FERRITIN: CPT

## 2025-02-27 PROCEDURE — 82043 UR ALBUMIN QUANTITATIVE: CPT

## 2025-02-27 PROCEDURE — 84443 ASSAY THYROID STIM HORMONE: CPT

## 2025-02-27 PROCEDURE — 82570 ASSAY OF URINE CREATININE: CPT

## 2025-02-27 PROCEDURE — 82607 VITAMIN B-12: CPT

## 2025-02-27 PROCEDURE — 84439 ASSAY OF FREE THYROXINE: CPT

## 2025-02-27 PROCEDURE — 83550 IRON BINDING TEST: CPT

## 2025-02-27 PROCEDURE — 85025 COMPLETE CBC W/AUTO DIFF WBC: CPT

## 2025-07-25 ENCOUNTER — APPOINTMENT (OUTPATIENT)
Dept: LAB | Facility: MEDICAL CENTER | Age: 45
End: 2025-07-25
Attending: INTERNAL MEDICINE
Payer: COMMERCIAL

## 2025-07-25 PROCEDURE — 93010 ELECTROCARDIOGRAM REPORT: CPT | Performed by: INTERNAL MEDICINE

## 2025-08-04 ENCOUNTER — APPOINTMENT (OUTPATIENT)
Dept: LAB | Facility: MEDICAL CENTER | Age: 45
End: 2025-08-04
Payer: COMMERCIAL

## 2025-08-04 DIAGNOSIS — E53.8 VITAMIN B 12 DEFICIENCY: ICD-10-CM

## 2025-08-04 DIAGNOSIS — E61.1 IRON DEFICIENCY: ICD-10-CM

## 2025-08-04 DIAGNOSIS — R79.89 LOW TSH LEVEL: ICD-10-CM

## 2025-08-04 DIAGNOSIS — E55.9 VITAMIN D DEFICIENCY: ICD-10-CM

## 2025-08-04 DIAGNOSIS — R03.0 ELEVATED BLOOD PRESSURE READING: ICD-10-CM

## 2025-08-04 LAB
25(OH)D3 SERPL-MCNC: 55.9 NG/ML (ref 30–100)
ALBUMIN SERPL BCG-MCNC: 4.2 G/DL (ref 3.5–5)
ALP SERPL-CCNC: 91 U/L (ref 34–104)
ALT SERPL W P-5'-P-CCNC: 20 U/L (ref 7–52)
ANION GAP SERPL CALCULATED.3IONS-SCNC: 7 MMOL/L (ref 4–13)
AST SERPL W P-5'-P-CCNC: 16 U/L (ref 13–39)
BASOPHILS # BLD AUTO: 0.03 THOUSANDS/ÂΜL (ref 0–0.1)
BASOPHILS NFR BLD AUTO: 0 % (ref 0–1)
BILIRUB SERPL-MCNC: 0.48 MG/DL (ref 0.2–1)
BUN SERPL-MCNC: 8 MG/DL (ref 5–25)
CALCIUM SERPL-MCNC: 9.4 MG/DL (ref 8.4–10.2)
CHLORIDE SERPL-SCNC: 103 MMOL/L (ref 96–108)
CO2 SERPL-SCNC: 27 MMOL/L (ref 21–32)
CREAT SERPL-MCNC: 0.5 MG/DL (ref 0.6–1.3)
EOSINOPHIL # BLD AUTO: 0.16 THOUSAND/ÂΜL (ref 0–0.61)
EOSINOPHIL NFR BLD AUTO: 2 % (ref 0–6)
ERYTHROCYTE [DISTWIDTH] IN BLOOD BY AUTOMATED COUNT: 14.5 % (ref 11.6–15.1)
FERRITIN SERPL-MCNC: 19 NG/ML (ref 30–307)
GFR SERPL CREATININE-BSD FRML MDRD: 117 ML/MIN/1.73SQ M
GLUCOSE P FAST SERPL-MCNC: 86 MG/DL (ref 65–99)
HCT VFR BLD AUTO: 38.7 % (ref 34.8–46.1)
HGB BLD-MCNC: 12.7 G/DL (ref 11.5–15.4)
IMM GRANULOCYTES # BLD AUTO: 0.04 THOUSAND/UL (ref 0–0.2)
IMM GRANULOCYTES NFR BLD AUTO: 1 % (ref 0–2)
IRON SATN MFR SERPL: 12 % (ref 15–50)
IRON SERPL-MCNC: 42 UG/DL (ref 50–212)
LYMPHOCYTES # BLD AUTO: 1.51 THOUSANDS/ÂΜL (ref 0.6–4.47)
LYMPHOCYTES NFR BLD AUTO: 23 % (ref 14–44)
MCH RBC QN AUTO: 26.2 PG (ref 26.8–34.3)
MCHC RBC AUTO-ENTMCNC: 32.8 G/DL (ref 31.4–37.4)
MCV RBC AUTO: 80 FL (ref 82–98)
MONOCYTES # BLD AUTO: 0.46 THOUSAND/ÂΜL (ref 0.17–1.22)
MONOCYTES NFR BLD AUTO: 7 % (ref 4–12)
NEUTROPHILS # BLD AUTO: 4.52 THOUSANDS/ÂΜL (ref 1.85–7.62)
NEUTS SEG NFR BLD AUTO: 67 % (ref 43–75)
NRBC BLD AUTO-RTO: 0 /100 WBCS
PLATELET # BLD AUTO: 261 THOUSANDS/UL (ref 149–390)
PMV BLD AUTO: 9.8 FL (ref 8.9–12.7)
POTASSIUM SERPL-SCNC: 4 MMOL/L (ref 3.5–5.3)
PROT SERPL-MCNC: 7.5 G/DL (ref 6.4–8.4)
RBC # BLD AUTO: 4.85 MILLION/UL (ref 3.81–5.12)
RETICS # AUTO: ABNORMAL 10*3/UL (ref 14097–95744)
RETICS # CALC: 3.08 % (ref 0.37–1.87)
SODIUM SERPL-SCNC: 137 MMOL/L (ref 135–147)
T3 SERPL-MCNC: 1.2 NG/ML (ref 0.9–1.8)
T4 FREE SERPL-MCNC: 0.95 NG/DL (ref 0.61–1.12)
TIBC SERPL-MCNC: 364 UG/DL (ref 250–450)
TRANSFERRIN SERPL-MCNC: 260 MG/DL (ref 203–362)
TSH SERPL DL<=0.05 MIU/L-ACNC: 0.16 UIU/ML (ref 0.45–4.5)
UIBC SERPL-MCNC: 322 UG/DL (ref 155–355)
VIT B12 SERPL-MCNC: 554 PG/ML (ref 180–914)
WBC # BLD AUTO: 6.72 THOUSAND/UL (ref 4.31–10.16)

## 2025-08-04 PROCEDURE — 83540 ASSAY OF IRON: CPT

## 2025-08-04 PROCEDURE — 84439 ASSAY OF FREE THYROXINE: CPT

## 2025-08-04 PROCEDURE — 85025 COMPLETE CBC W/AUTO DIFF WBC: CPT

## 2025-08-04 PROCEDURE — 80053 COMPREHEN METABOLIC PANEL: CPT

## 2025-08-04 PROCEDURE — 84443 ASSAY THYROID STIM HORMONE: CPT

## 2025-08-04 PROCEDURE — 85045 AUTOMATED RETICULOCYTE COUNT: CPT

## 2025-08-04 PROCEDURE — 82728 ASSAY OF FERRITIN: CPT

## 2025-08-04 PROCEDURE — 36415 COLL VENOUS BLD VENIPUNCTURE: CPT

## 2025-08-04 PROCEDURE — 82306 VITAMIN D 25 HYDROXY: CPT

## 2025-08-04 PROCEDURE — 82607 VITAMIN B-12: CPT

## 2025-08-04 PROCEDURE — 83550 IRON BINDING TEST: CPT

## 2025-08-04 PROCEDURE — 84480 ASSAY TRIIODOTHYRONINE (T3): CPT
